# Patient Record
Sex: FEMALE | Race: OTHER | URBAN - METROPOLITAN AREA
[De-identification: names, ages, dates, MRNs, and addresses within clinical notes are randomized per-mention and may not be internally consistent; named-entity substitution may affect disease eponyms.]

---

## 2017-05-22 ENCOUNTER — IMPORTED ENCOUNTER (OUTPATIENT)
Dept: URBAN - METROPOLITAN AREA CLINIC 50 | Facility: CLINIC | Age: 67
End: 2017-05-22

## 2017-05-30 ENCOUNTER — IMPORTED ENCOUNTER (OUTPATIENT)
Dept: URBAN - METROPOLITAN AREA CLINIC 50 | Facility: CLINIC | Age: 67
End: 2017-05-30

## 2018-04-02 NOTE — PATIENT DISCUSSION
"""Phaco with IOL OS: 04/12/2018 Aroldo ZCB00 21.0 Target: [facility] [place]

## 2018-04-12 NOTE — PATIENT DISCUSSION
"""S/P IOL OS: Tecnis ZCB00 21.0 (Target: [place]) +Femto/Arcs +TM. Continue post operative instructions and drops per schedule.  """

## 2018-04-16 NOTE — PATIENT DISCUSSION
"""S/P IOL OS: Tecnis ZCB00 21.0 (Target: [place]) +Femto/Arcs +TM. Continue post operative instructions and drops per schedule.  """

## 2018-05-21 NOTE — PATIENT DISCUSSION
"""S/P IOL OS: Tecnis ZCB00 21.0 (Target: [place]) +Femto/Arcs +TM. Continue post operative instructions and drops per schedule.  """

## 2019-05-09 ENCOUNTER — IMPORTED ENCOUNTER (OUTPATIENT)
Dept: URBAN - METROPOLITAN AREA CLINIC 50 | Facility: CLINIC | Age: 69
End: 2019-05-09

## 2020-06-23 ENCOUNTER — IMPORTED ENCOUNTER (OUTPATIENT)
Dept: URBAN - METROPOLITAN AREA CLINIC 50 | Facility: CLINIC | Age: 70
End: 2020-06-23

## 2021-04-17 ENCOUNTER — PREPPED CHART (OUTPATIENT)
Dept: URBAN - METROPOLITAN AREA CLINIC 52 | Facility: CLINIC | Age: 71
End: 2021-04-17

## 2021-04-17 ASSESSMENT — VISUAL ACUITY
OD_CC: 20/20
OD_GLARE: 20/30
OU_CC: J1+
OS_GLARE: 20/25
OS_GLARE: 20/30
OD_GLARE: 20/25
OS_CC: 20/20

## 2021-04-17 ASSESSMENT — TONOMETRY
OS_IOP_MMHG: 13
OD_IOP_MMHG: 13

## 2021-04-18 ASSESSMENT — TONOMETRY
OD_IOP_MMHG: 12
OS_IOP_MMHG: 13
OD_IOP_MMHG: 11
OS_IOP_MMHG: 11
OD_IOP_MMHG: 13
OS_IOP_MMHG: 13

## 2021-04-18 ASSESSMENT — VISUAL ACUITY
OS_CC: 20/20
OD_CC: J1+
OS_CC: 20/30
OD_BAT: 20/25
OD_CC: 20/20
OS_BAT: 20/25
OS_CC: 20/20
OS_BAT: 20/30
OS_OTHER: 20/30. 20/30.
OD_CC: 20/20
OD_CC: J1+
OS_OTHER: 20/25. 20/30.
OS_CC: J1+
OD_BAT: 20/30
OS_CC: J1+
OD_CC: 20/20
OD_OTHER: 20/25. 20/30.
OD_OTHER: 20/30. 20/30.

## 2021-04-21 ENCOUNTER — COMPREHENSIVE EXAM (OUTPATIENT)
Dept: URBAN - METROPOLITAN AREA CLINIC 49 | Facility: CLINIC | Age: 71
End: 2021-04-21

## 2021-04-21 DIAGNOSIS — H25.13: ICD-10-CM

## 2021-04-21 DIAGNOSIS — H43.393: ICD-10-CM

## 2021-04-21 PROCEDURE — 92014 COMPRE OPH EXAM EST PT 1/>: CPT

## 2021-04-21 ASSESSMENT — TONOMETRY
OD_IOP_MMHG: 13
OS_IOP_MMHG: 14

## 2021-04-21 ASSESSMENT — VISUAL ACUITY
OS_CC: 20/20
OS_GLARE: 20/30
OD_GLARE: 20/30
OU_CC: J1+
OD_CC: 20/20
OD_GLARE: 20/50
OS_GLARE: 20/50

## 2021-04-21 NOTE — PATIENT DISCUSSION
NOT VISUALLY SIGNIFICANT: Informed patient that their cataract is not visually significant or does not meet the criteria for cataract surgery. Recommend attention to cataract symptoms monitoring by regular examinations. Patient instructed to call with changes in vision. Pt defers MRX today.

## 2022-04-20 ENCOUNTER — COMPREHENSIVE EXAM (OUTPATIENT)
Dept: URBAN - METROPOLITAN AREA CLINIC 49 | Facility: CLINIC | Age: 72
End: 2022-04-20

## 2022-04-20 DIAGNOSIS — H25.13: ICD-10-CM

## 2022-04-20 DIAGNOSIS — H43.393: ICD-10-CM

## 2022-04-20 PROCEDURE — 92014 COMPRE OPH EXAM EST PT 1/>: CPT

## 2022-04-20 ASSESSMENT — TONOMETRY
OD_IOP_MMHG: 13
OS_IOP_MMHG: 13

## 2022-04-20 ASSESSMENT — VISUAL ACUITY
OS_CC: 20/20-2
OS_GLARE: 20/20
OS_GLARE: 20/40
OU_CC: J1+/-1
OD_CC: 20/25-1
OD_GLARE: 20/25-2
OD_GLARE: 20/40

## 2023-04-26 ENCOUNTER — COMPREHENSIVE EXAM (OUTPATIENT)
Dept: URBAN - METROPOLITAN AREA CLINIC 49 | Facility: CLINIC | Age: 73
End: 2023-04-26

## 2023-04-26 DIAGNOSIS — H43.393: ICD-10-CM

## 2023-04-26 DIAGNOSIS — H25.13: ICD-10-CM

## 2023-04-26 PROCEDURE — 92014 COMPRE OPH EXAM EST PT 1/>: CPT

## 2023-04-26 PROCEDURE — 92015 DETERMINE REFRACTIVE STATE: CPT

## 2023-04-26 ASSESSMENT — VISUAL ACUITY
OD_GLARE: 20/25
OS_SC: 20/25-2
OU_SC: 20/25
OD_SC: 20/30-1
OS_GLARE: 20/30
OU_CC: J1+@16"
OD_GLARE: 20/20
OS_GLARE: 20/20
OU_SC: J1+@16"

## 2023-04-26 ASSESSMENT — TONOMETRY
OS_IOP_MMHG: 13
OD_IOP_MMHG: 13

## 2024-05-02 ENCOUNTER — COMPREHENSIVE EXAM (OUTPATIENT)
Dept: URBAN - METROPOLITAN AREA CLINIC 49 | Facility: LOCATION | Age: 74
End: 2024-05-02

## 2024-05-02 DIAGNOSIS — H25.13: ICD-10-CM

## 2024-05-02 DIAGNOSIS — H43.813: ICD-10-CM

## 2024-05-02 DIAGNOSIS — H52.4: ICD-10-CM

## 2024-05-02 PROCEDURE — 92014 COMPRE OPH EXAM EST PT 1/>: CPT

## 2024-05-02 PROCEDURE — 92015 DETERMINE REFRACTIVE STATE: CPT

## 2024-05-02 ASSESSMENT — VISUAL ACUITY
OD_GLARE: 20/20
OS_GLARE: 20/25
OS_CC: 20/30
OU_CC: 20/25
OU_CC: J2
OD_GLARE: 20/25
OS_PH: 20/25
OS_GLARE: 20/20
OD_PH: 20/20
OD_CC: 20/30+2

## 2024-05-02 ASSESSMENT — TONOMETRY
OD_IOP_MMHG: 14
OS_IOP_MMHG: 15

## 2025-05-07 ENCOUNTER — COMPREHENSIVE EXAM (OUTPATIENT)
Age: 75
End: 2025-05-07

## 2025-05-07 DIAGNOSIS — H10.13: ICD-10-CM

## 2025-05-07 DIAGNOSIS — H25.13: ICD-10-CM

## 2025-05-07 DIAGNOSIS — H52.4: ICD-10-CM

## 2025-05-07 DIAGNOSIS — H43.812: ICD-10-CM

## 2025-05-07 PROCEDURE — 92015 DETERMINE REFRACTIVE STATE: CPT

## 2025-05-07 PROCEDURE — 99214 OFFICE O/P EST MOD 30 MIN: CPT
